# Patient Record
Sex: FEMALE | URBAN - METROPOLITAN AREA
[De-identification: names, ages, dates, MRNs, and addresses within clinical notes are randomized per-mention and may not be internally consistent; named-entity substitution may affect disease eponyms.]

---

## 2017-04-06 ENCOUNTER — INPATIENT (INPATIENT)
Facility: HOSPITAL | Age: 37
LOS: 3 days | Discharge: HOME | End: 2017-04-10
Attending: PSYCHIATRY & NEUROLOGY | Admitting: PSYCHIATRY & NEUROLOGY
Payer: MEDICARE

## 2017-04-06 VITALS
SYSTOLIC BLOOD PRESSURE: 137 MMHG | HEIGHT: 65 IN | DIASTOLIC BLOOD PRESSURE: 91 MMHG | OXYGEN SATURATION: 98 % | HEART RATE: 87 BPM | RESPIRATION RATE: 16 BRPM | WEIGHT: 141.98 LBS | TEMPERATURE: 98 F

## 2017-04-06 DIAGNOSIS — Z91.19 PATIENT'S NONCOMPLIANCE WITH OTHER MEDICAL TREATMENT AND REGIMEN: ICD-10-CM

## 2017-04-06 DIAGNOSIS — F25.0 SCHIZOAFFECTIVE DISORDER, BIPOLAR TYPE: ICD-10-CM

## 2017-04-06 DIAGNOSIS — F10.10 ALCOHOL ABUSE, UNCOMPLICATED: ICD-10-CM

## 2017-04-06 DIAGNOSIS — R69 ILLNESS, UNSPECIFIED: ICD-10-CM

## 2017-04-06 LAB
ALBUMIN SERPL ELPH-MCNC: 3.7 G/DL — SIGNIFICANT CHANGE UP (ref 3.3–5)
ALP SERPL-CCNC: 92 U/L — SIGNIFICANT CHANGE UP (ref 40–120)
ALT FLD-CCNC: 29 U/L — SIGNIFICANT CHANGE UP (ref 12–78)
AMPHET UR-MCNC: NEGATIVE — SIGNIFICANT CHANGE UP
ANION GAP SERPL CALC-SCNC: 9 MMOL/L — SIGNIFICANT CHANGE UP (ref 5–17)
APAP SERPL-MCNC: <2 UG/ML — LOW (ref 10–30)
APPEARANCE UR: CLEAR — SIGNIFICANT CHANGE UP
AST SERPL-CCNC: 21 U/L — SIGNIFICANT CHANGE UP (ref 15–37)
BACTERIA # UR AUTO: ABNORMAL
BARBITURATES UR SCN-MCNC: NEGATIVE — SIGNIFICANT CHANGE UP
BASOPHILS # BLD AUTO: 0.1 K/UL — SIGNIFICANT CHANGE UP (ref 0–0.2)
BASOPHILS NFR BLD AUTO: 0.8 % — SIGNIFICANT CHANGE UP (ref 0–2)
BENZODIAZ UR-MCNC: NEGATIVE — SIGNIFICANT CHANGE UP
BILIRUB SERPL-MCNC: 0.2 MG/DL — SIGNIFICANT CHANGE UP (ref 0.2–1.2)
BILIRUB UR-MCNC: NEGATIVE — SIGNIFICANT CHANGE UP
BUN SERPL-MCNC: 16 MG/DL — SIGNIFICANT CHANGE UP (ref 7–23)
CALCIUM SERPL-MCNC: 8.3 MG/DL — LOW (ref 8.5–10.1)
CHLORIDE SERPL-SCNC: 109 MMOL/L — HIGH (ref 96–108)
CO2 SERPL-SCNC: 27 MMOL/L — SIGNIFICANT CHANGE UP (ref 22–31)
COCAINE METAB.OTHER UR-MCNC: NEGATIVE — SIGNIFICANT CHANGE UP
COLOR SPEC: YELLOW — SIGNIFICANT CHANGE UP
CREAT SERPL-MCNC: 0.7 MG/DL — SIGNIFICANT CHANGE UP (ref 0.5–1.3)
DIFF PNL FLD: ABNORMAL
EOSINOPHIL # BLD AUTO: 0.1 K/UL — SIGNIFICANT CHANGE UP (ref 0–0.5)
EOSINOPHIL NFR BLD AUTO: 1.6 % — SIGNIFICANT CHANGE UP (ref 0–6)
EPI CELLS # UR: SIGNIFICANT CHANGE UP
ETHANOL SERPL-MCNC: 52 MG/DL — HIGH (ref 0–10)
GLUCOSE SERPL-MCNC: 93 MG/DL — SIGNIFICANT CHANGE UP (ref 70–99)
GLUCOSE UR QL: NEGATIVE MG/DL — SIGNIFICANT CHANGE UP
HCG SERPL-ACNC: <1 MIU/ML — SIGNIFICANT CHANGE UP
HCT VFR BLD CALC: 37.9 % — SIGNIFICANT CHANGE UP (ref 34.5–45)
HGB BLD-MCNC: 13 G/DL — SIGNIFICANT CHANGE UP (ref 11.5–15.5)
KETONES UR-MCNC: NEGATIVE — SIGNIFICANT CHANGE UP
LEUKOCYTE ESTERASE UR-ACNC: ABNORMAL
LYMPHOCYTES # BLD AUTO: 2 K/UL — SIGNIFICANT CHANGE UP (ref 1–3.3)
LYMPHOCYTES # BLD AUTO: 29.2 % — SIGNIFICANT CHANGE UP (ref 13–44)
MCHC RBC-ENTMCNC: 29.1 PG — SIGNIFICANT CHANGE UP (ref 27–34)
MCHC RBC-ENTMCNC: 34.4 GM/DL — SIGNIFICANT CHANGE UP (ref 32–36)
MCV RBC AUTO: 84.6 FL — SIGNIFICANT CHANGE UP (ref 80–100)
METHADONE UR-MCNC: NEGATIVE — SIGNIFICANT CHANGE UP
MONOCYTES # BLD AUTO: 0.6 K/UL — SIGNIFICANT CHANGE UP (ref 0–0.9)
MONOCYTES NFR BLD AUTO: 8.6 % — SIGNIFICANT CHANGE UP (ref 2–14)
NEUTROPHILS # BLD AUTO: 4.2 K/UL — SIGNIFICANT CHANGE UP (ref 1.8–7.4)
NEUTROPHILS NFR BLD AUTO: 59.8 % — SIGNIFICANT CHANGE UP (ref 43–77)
NITRITE UR-MCNC: NEGATIVE — SIGNIFICANT CHANGE UP
OPIATES UR-MCNC: NEGATIVE — SIGNIFICANT CHANGE UP
PCP SPEC-MCNC: SIGNIFICANT CHANGE UP
PCP UR-MCNC: NEGATIVE — SIGNIFICANT CHANGE UP
PH UR: 5 — SIGNIFICANT CHANGE UP (ref 4.8–8)
PLATELET # BLD AUTO: 231 K/UL — SIGNIFICANT CHANGE UP (ref 150–400)
POTASSIUM SERPL-MCNC: 4.1 MMOL/L — SIGNIFICANT CHANGE UP (ref 3.5–5.3)
POTASSIUM SERPL-SCNC: 4.1 MMOL/L — SIGNIFICANT CHANGE UP (ref 3.5–5.3)
PROT SERPL-MCNC: 7.2 GM/DL — SIGNIFICANT CHANGE UP (ref 6–8.3)
PROT UR-MCNC: 15 MG/DL
RBC # BLD: 4.48 M/UL — SIGNIFICANT CHANGE UP (ref 3.8–5.2)
RBC # FLD: 12.9 % — SIGNIFICANT CHANGE UP (ref 11–15)
RBC CASTS # UR COMP ASSIST: ABNORMAL /HPF (ref 0–4)
SALICYLATES SERPL-MCNC: 2.6 MG/DL — LOW (ref 2.8–20)
SODIUM SERPL-SCNC: 145 MMOL/L — SIGNIFICANT CHANGE UP (ref 135–145)
SP GR SPEC: 1.02 — SIGNIFICANT CHANGE UP (ref 1.01–1.02)
T3 SERPL-MCNC: 123 NG/DL — SIGNIFICANT CHANGE UP (ref 80–200)
T4 AB SER-ACNC: 7.5 UG/DL — SIGNIFICANT CHANGE UP (ref 4.6–12)
THC UR QL: NEGATIVE — SIGNIFICANT CHANGE UP
TSH SERPL-MCNC: 0.82 UIU/ML — SIGNIFICANT CHANGE UP (ref 0.36–3.74)
UROBILINOGEN FLD QL: NEGATIVE MG/DL — SIGNIFICANT CHANGE UP
WBC # BLD: 7 K/UL — SIGNIFICANT CHANGE UP (ref 3.8–10.5)
WBC # FLD AUTO: 7 K/UL — SIGNIFICANT CHANGE UP (ref 3.8–10.5)
WBC UR QL: SIGNIFICANT CHANGE UP

## 2017-04-06 PROCEDURE — 90792 PSYCH DIAG EVAL W/MED SRVCS: CPT

## 2017-04-06 PROCEDURE — 70450 CT HEAD/BRAIN W/O DYE: CPT | Mod: 26

## 2017-04-06 PROCEDURE — 99284 EMERGENCY DEPT VISIT MOD MDM: CPT

## 2017-04-06 RX ORDER — DIPHENHYDRAMINE HCL 50 MG
50 CAPSULE ORAL ONCE
Qty: 0 | Refills: 0 | Status: DISCONTINUED | OUTPATIENT
Start: 2017-04-06 | End: 2017-04-10

## 2017-04-06 RX ORDER — RISPERIDONE 4 MG/1
0.5 TABLET ORAL
Qty: 0 | Refills: 0 | Status: DISCONTINUED | OUTPATIENT
Start: 2017-04-06 | End: 2017-04-10

## 2017-04-06 RX ORDER — HALOPERIDOL DECANOATE 100 MG/ML
5 INJECTION INTRAMUSCULAR ONCE
Qty: 0 | Refills: 0 | Status: DISCONTINUED | OUTPATIENT
Start: 2017-04-06 | End: 2017-04-10

## 2017-04-06 RX ORDER — HALOPERIDOL DECANOATE 100 MG/ML
5 INJECTION INTRAMUSCULAR EVERY 6 HOURS
Qty: 0 | Refills: 0 | Status: DISCONTINUED | OUTPATIENT
Start: 2017-04-06 | End: 2017-04-10

## 2017-04-06 RX ORDER — DIPHENHYDRAMINE HCL 50 MG
50 CAPSULE ORAL EVERY 6 HOURS
Qty: 0 | Refills: 0 | Status: DISCONTINUED | OUTPATIENT
Start: 2017-04-06 | End: 2017-04-10

## 2017-04-06 RX ORDER — ACETAMINOPHEN 500 MG
650 TABLET ORAL EVERY 6 HOURS
Qty: 0 | Refills: 0 | Status: DISCONTINUED | OUTPATIENT
Start: 2017-04-06 | End: 2017-04-10

## 2017-04-06 RX ADMIN — RISPERIDONE 0.5 MILLIGRAM(S): 4 TABLET ORAL at 21:14

## 2017-04-06 NOTE — ED BEHAVIORAL HEALTH ASSESSMENT NOTE - AXIS IV
Problems with primary support/Economic problems/Problems with access to healthcare services/Problem related to social environment/Housing problems/Other psychosocial and environmental problems

## 2017-04-06 NOTE — ED ADULT TRIAGE NOTE - CHIEF COMPLAINT QUOTE
brought by ems for psych evaluation. pt is from the [facility] ( [facility])  was acting bizzare

## 2017-04-06 NOTE — ED BEHAVIORAL HEALTH ASSESSMENT NOTE - DESCRIPTION
Calm, but guarded and evasive Seizure disorder? The patient is a 36 year old woman, single, registered with [facility], has limited family supports

## 2017-04-06 NOTE — ED ADULT NURSE NOTE - OBJECTIVE STATEMENT
patient received, alert and oriented x3, from [facility], patient refuses to talk about it. states she has a "seizure" denies hurting self or others. patient appears calm, noticed patient talking to herself. denies hearing voices

## 2017-04-06 NOTE — ED ADULT NURSE NOTE - CHIEF COMPLAINT QUOTE
brought by ems for psych evaluation. pt is from the [facility] ( [facility])  was acting bizzare

## 2017-04-06 NOTE — ED BEHAVIORAL HEALTH ASSESSMENT NOTE - PSYCHIATRIC ISSUES AND PLAN (INCLUDE STANDING AND PRN MEDICATION)
Start a trial of Risperidone 0.5mg BID PO, titrate to 1mg BID PO tomorrow. Consider a mood stabilizer.

## 2017-04-06 NOTE — ED BEHAVIORAL HEALTH ASSESSMENT NOTE - OTHER PAST PSYCHIATRIC HISTORY (INCLUDE DETAILS REGARDING ONSET, COURSE OF ILLNESS, INPATIENT/OUTPATIENT TREATMENT)
Patient has a suspected psychiatric history of Schizophrenia/Schizoaffective disorder-bipolar type, likely multiple prior inpatient psychiatric admissions, unclear about prior SI or HI or legal issues, has a documented history of Alcohol abuse.

## 2017-04-06 NOTE — ED BEHAVIORAL HEALTH ASSESSMENT NOTE - HPI (INCLUDE ILLNESS QUALITY, SEVERITY, DURATION, TIMING, CONTEXT, MODIFYING FACTORS, ASSOCIATED SIGNS AND SYMPTOMS)
Briefly, the patient is a 36 year old woman, single, registered with [facility], has limited family supports, states that she has a medical history significant for Seizure disorder, has a suspected psychiatric history of Schizophrenia/Schizoaffective disorder-bipolar type, likely multiple prior inpatient psychiatric admissions, unclear about prior SI or HI or legal issues, has a documented history of Alcohol abuse, was sent from shelter with complaints of worsening agitation, erratic behaviors, threats of aggression and responding to internal stimuli. Here in the ED, patient refuses to sign documentations. Calm, but intermittently can be noted to be responding to internal stimuli. BAL: 52 this morning  Met with the patient. Bizarre, guarded, evasive, and can be rather hostile and irritable. During the interview noted that patient was rather labile, with thought process that was notably disorganized, with FOI and KIKA. Paranoid++- ' My mother is sending spies on me. They in Hotel. I am just going to get her deported', etc. Multiple other paranoid delusions noted during the interview. Noted to be also responding to internal stimuli during the interview- ' Didn't you hear that. She just called me Bipolar'. Unable to engage in a rationale conversation with the patient considering her lability, hostility and thought disorganization. Admits to drinking alcohol yesterday, and again very perseverative about ' Put on alcohol abuse in my diagnosis. Don't defame me with a diagnosis of Schizophrenia or Bipolar disorder. I will take you to court for defamation'. Discussed history of seizure, and patient states- ' I am having a seizure right now. Cant you hear my seizure?'.   Obtained collateral from the patient's CM at [facility]. States that patient has been in the shelter system since 2015, and has been at this particular hotel since 3/2017. States that usually patient is bizarre and erratic, and generally avoids an evaluation. States that yesterday patient appeared intoxicated, was aggressive and agitated. This morning despite having sobered up, patient was erratic, bizarre, picked up fights with residents, threatened to 'kill' the '. As per staff, patient has been responding to internal stimuli in the hotel.

## 2017-04-06 NOTE — ED BEHAVIORAL HEALTH ASSESSMENT NOTE - OTHER
JOSE Scott ([phone]- ext: [phone]) with other residents normal here not assessed none obvious no family supports, noncompliance

## 2017-04-06 NOTE — ED PROVIDER NOTE - OBJECTIVE STATEMENT
37yo female with PMHx of schizophrenia and questionable compliance with meds was sent to the ED from her shelter [facility] after she was found by the staff to be visibly intoxicated, agitated, arguing with other clients, and urinating on the floor.  The patient states she only has a history of seizure disorder with unknown etiology on Tegretol; with uncertain seizure pattern.  Pt denies seizure today.  When attempting to obtain HPI and ROS pt becomes very agitated and does not answer further questioning.  History obtained from Sonia Yates (at [facility]) [phone] ext [phone].   Amalia Lai ext [phone]

## 2017-04-06 NOTE — ED PROVIDER NOTE - MEDICAL DECISION MAKING DETAILS
35yo female sent from [facility] for bizarre behavior.  Pt gives no further hx of ROS but is medically cleared and will be psychiatrically evaluated

## 2017-04-06 NOTE — ED BEHAVIORAL HEALTH ASSESSMENT NOTE - SUMMARY
Briefly, the patient is a 36 year old woman, single, registered with [employer], has limited family supports, states that she has a medical history significant for Seizure disorder, has a suspected psychiatric history of Schizophrenia/Schizoaffective disorder-bipolar type, likely multiple prior inpatient psychiatric admissions, unclear about prior SI or HI or legal issues, has a documented history of Alcohol abuse, was sent from shelter with complaints of worsening agitation, erratic behaviors, threats of aggression and responding to internal stimuli. Here in the ED, patient refuses to sign documentations. Calm, but intermittently can be noted to be responding to internal stimuli. BAL: 52 this morning  Bizarre, guarded, evasive, and can be rather hostile and irritable. During the interview noted that patient was rather labile, with thought process that was notably disorganized, with FOI and KIKA. Paranoid delusions++-. Collateral points towards worsening psychiatric decompensation along with agitation, aggression, and AH. Alcohol intoxication likely contributed to be behavioral issues yesterday, but now patient is sober, and her presentation appears acute and more contributed by decompensated Schizophrenia/Schizoaffective disorder. Patient at this point requires an inpatient psychiatric admission for symptom stabilization, medication management and ensuring safety. 9.39 signed

## 2017-04-07 PROCEDURE — 99222 1ST HOSP IP/OBS MODERATE 55: CPT

## 2017-04-07 PROCEDURE — 99233 SBSQ HOSP IP/OBS HIGH 50: CPT

## 2017-04-07 RX ORDER — CARBAMAZEPINE 200 MG
100 TABLET ORAL DAILY
Qty: 0 | Refills: 0 | Status: DISCONTINUED | OUTPATIENT
Start: 2017-04-07 | End: 2017-04-10

## 2017-04-07 RX ORDER — FOLIC ACID 0.8 MG
1 TABLET ORAL DAILY
Qty: 0 | Refills: 0 | Status: DISCONTINUED | OUTPATIENT
Start: 2017-04-07 | End: 2017-04-10

## 2017-04-07 RX ORDER — PETROLATUM,WHITE
1 JELLY (GRAM) TOPICAL
Qty: 0 | Refills: 0 | Status: DISCONTINUED | OUTPATIENT
Start: 2017-04-07 | End: 2017-04-10

## 2017-04-07 RX ADMIN — Medication 100 MILLIGRAM(S): at 14:34

## 2017-04-07 RX ADMIN — Medication 2 MILLIGRAM(S): at 06:36

## 2017-04-07 RX ADMIN — Medication 2 MILLIGRAM(S): at 16:01

## 2017-04-07 RX ADMIN — Medication 2 MILLIGRAM(S): at 20:39

## 2017-04-07 RX ADMIN — RISPERIDONE 0.5 MILLIGRAM(S): 4 TABLET ORAL at 09:17

## 2017-04-07 RX ADMIN — RISPERIDONE 0.5 MILLIGRAM(S): 4 TABLET ORAL at 20:39

## 2017-04-07 RX ADMIN — Medication 1 MILLIGRAM(S): at 09:18

## 2017-04-08 LAB
CHOLEST SERPL-MCNC: 196 MG/DL — SIGNIFICANT CHANGE UP (ref 10–199)
HBA1C BLD-MCNC: 5.9 % — HIGH (ref 4–5.6)
HDLC SERPL-MCNC: 52 MG/DL — SIGNIFICANT CHANGE UP (ref 40–125)
LIPID PNL WITH DIRECT LDL SERPL: 131 MG/DL — HIGH
TOTAL CHOLESTEROL/HDL RATIO MEASUREMENT: 3.8 RATIO — SIGNIFICANT CHANGE UP (ref 3.3–7.1)
TRIGL SERPL-MCNC: 64 MG/DL — SIGNIFICANT CHANGE UP (ref 10–149)

## 2017-04-08 PROCEDURE — 99231 SBSQ HOSP IP/OBS SF/LOW 25: CPT

## 2017-04-08 RX ADMIN — Medication 1 MILLIGRAM(S): at 09:50

## 2017-04-08 RX ADMIN — RISPERIDONE 0.5 MILLIGRAM(S): 4 TABLET ORAL at 20:37

## 2017-04-08 RX ADMIN — Medication 1.5 MILLIGRAM(S): at 09:51

## 2017-04-08 RX ADMIN — Medication 1 TABLET(S): at 09:50

## 2017-04-08 RX ADMIN — Medication 1.5 MILLIGRAM(S): at 18:05

## 2017-04-08 RX ADMIN — Medication 100 MILLIGRAM(S): at 09:50

## 2017-04-08 RX ADMIN — Medication 1.5 MILLIGRAM(S): at 20:38

## 2017-04-08 RX ADMIN — Medication 1.5 MILLIGRAM(S): at 14:46

## 2017-04-08 RX ADMIN — RISPERIDONE 0.5 MILLIGRAM(S): 4 TABLET ORAL at 09:50

## 2017-04-09 PROCEDURE — 99232 SBSQ HOSP IP/OBS MODERATE 35: CPT

## 2017-04-09 RX ADMIN — Medication 100 MILLIGRAM(S): at 09:21

## 2017-04-09 RX ADMIN — Medication 1.5 MILLIGRAM(S): at 05:59

## 2017-04-09 RX ADMIN — RISPERIDONE 0.5 MILLIGRAM(S): 4 TABLET ORAL at 20:18

## 2017-04-09 RX ADMIN — Medication 1 MILLIGRAM(S): at 09:21

## 2017-04-09 RX ADMIN — Medication 1 MILLIGRAM(S): at 18:49

## 2017-04-09 RX ADMIN — RISPERIDONE 0.5 MILLIGRAM(S): 4 TABLET ORAL at 09:21

## 2017-04-09 RX ADMIN — Medication 1 TABLET(S): at 09:21

## 2017-04-10 VITALS
SYSTOLIC BLOOD PRESSURE: 107 MMHG | HEART RATE: 101 BPM | OXYGEN SATURATION: 100 % | RESPIRATION RATE: 18 BRPM | TEMPERATURE: 99 F | DIASTOLIC BLOOD PRESSURE: 68 MMHG

## 2017-04-10 PROCEDURE — 99239 HOSP IP/OBS DSCHRG MGMT >30: CPT

## 2017-04-10 RX ORDER — RISPERIDONE 4 MG/1
1 TABLET ORAL
Qty: 60 | Refills: 0 | OUTPATIENT
Start: 2017-04-10 | End: 2017-05-10

## 2017-04-10 RX ORDER — CARBAMAZEPINE 200 MG
1 TABLET ORAL
Qty: 30 | Refills: 0 | OUTPATIENT
Start: 2017-04-10 | End: 2017-05-10

## 2017-04-10 RX ORDER — FOLIC ACID 0.8 MG
1 TABLET ORAL
Qty: 30 | Refills: 0 | OUTPATIENT
Start: 2017-04-10 | End: 2017-05-10

## 2017-04-10 RX ADMIN — RISPERIDONE 0.5 MILLIGRAM(S): 4 TABLET ORAL at 09:14

## 2017-04-10 RX ADMIN — Medication 1 MILLIGRAM(S): at 09:14

## 2017-04-10 RX ADMIN — Medication 1 TABLET(S): at 09:14

## 2017-04-10 RX ADMIN — Medication 1 MILLIGRAM(S): at 06:49

## 2017-04-10 RX ADMIN — Medication 100 MILLIGRAM(S): at 09:14

## 2017-04-10 RX ADMIN — Medication 1 MILLIGRAM(S): at 13:46

## 2017-04-12 DIAGNOSIS — F25.0 SCHIZOAFFECTIVE DISORDER, BIPOLAR TYPE: ICD-10-CM

## 2017-04-12 DIAGNOSIS — R44.0 AUDITORY HALLUCINATIONS: ICD-10-CM

## 2017-04-12 DIAGNOSIS — F10.20 ALCOHOL DEPENDENCE, UNCOMPLICATED: ICD-10-CM

## 2017-04-12 DIAGNOSIS — Z91.14 PATIENT'S OTHER NONCOMPLIANCE WITH MEDICATION REGIMEN: ICD-10-CM

## 2017-04-12 DIAGNOSIS — F20.9 SCHIZOPHRENIA, UNSPECIFIED: ICD-10-CM

## 2017-04-12 DIAGNOSIS — G40.909 EPILEPSY, UNSPECIFIED, NOT INTRACTABLE, WITHOUT STATUS EPILEPTICUS: ICD-10-CM

## 2017-04-13 ENCOUNTER — INPATIENT (INPATIENT)
Facility: HOSPITAL | Age: 37
LOS: 0 days | Discharge: HOME | End: 2017-04-14
Attending: PSYCHIATRY & NEUROLOGY | Admitting: PSYCHIATRY & NEUROLOGY
Payer: MEDICARE

## 2017-04-13 VITALS
DIASTOLIC BLOOD PRESSURE: 94 MMHG | HEART RATE: 100 BPM | TEMPERATURE: 97 F | OXYGEN SATURATION: 98 % | RESPIRATION RATE: 17 BRPM | SYSTOLIC BLOOD PRESSURE: 137 MMHG | WEIGHT: 164.91 LBS | HEIGHT: 65 IN

## 2017-04-13 DIAGNOSIS — F12.10 CANNABIS ABUSE, UNCOMPLICATED: ICD-10-CM

## 2017-04-13 DIAGNOSIS — Z91.19 PATIENT'S NONCOMPLIANCE WITH OTHER MEDICAL TREATMENT AND REGIMEN: ICD-10-CM

## 2017-04-13 DIAGNOSIS — F10.10 ALCOHOL ABUSE, UNCOMPLICATED: ICD-10-CM

## 2017-04-13 DIAGNOSIS — F25.0 SCHIZOAFFECTIVE DISORDER, BIPOLAR TYPE: ICD-10-CM

## 2017-04-13 DIAGNOSIS — F60.9 PERSONALITY DISORDER, UNSPECIFIED: ICD-10-CM

## 2017-04-13 LAB
ALBUMIN SERPL ELPH-MCNC: 3.7 G/DL — SIGNIFICANT CHANGE UP (ref 3.3–5)
ALP SERPL-CCNC: 105 U/L — SIGNIFICANT CHANGE UP (ref 40–120)
ALT FLD-CCNC: 26 U/L — SIGNIFICANT CHANGE UP (ref 12–78)
AMPHET UR-MCNC: NEGATIVE — SIGNIFICANT CHANGE UP
ANION GAP SERPL CALC-SCNC: 11 MMOL/L — SIGNIFICANT CHANGE UP (ref 5–17)
APAP SERPL-MCNC: <2 UG/ML — LOW (ref 10–30)
APPEARANCE UR: CLEAR — SIGNIFICANT CHANGE UP
AST SERPL-CCNC: 22 U/L — SIGNIFICANT CHANGE UP (ref 15–37)
BACTERIA # UR AUTO: ABNORMAL
BARBITURATES UR SCN-MCNC: NEGATIVE — SIGNIFICANT CHANGE UP
BASOPHILS # BLD AUTO: 0.1 K/UL — SIGNIFICANT CHANGE UP (ref 0–0.2)
BASOPHILS NFR BLD AUTO: 0.9 % — SIGNIFICANT CHANGE UP (ref 0–2)
BENZODIAZ UR-MCNC: NEGATIVE — SIGNIFICANT CHANGE UP
BILIRUB SERPL-MCNC: 0.4 MG/DL — SIGNIFICANT CHANGE UP (ref 0.2–1.2)
BILIRUB UR-MCNC: NEGATIVE — SIGNIFICANT CHANGE UP
BUN SERPL-MCNC: 12 MG/DL — SIGNIFICANT CHANGE UP (ref 7–23)
CALCIUM SERPL-MCNC: 8.3 MG/DL — LOW (ref 8.5–10.1)
CHLORIDE SERPL-SCNC: 105 MMOL/L — SIGNIFICANT CHANGE UP (ref 96–108)
CO2 SERPL-SCNC: 25 MMOL/L — SIGNIFICANT CHANGE UP (ref 22–31)
COCAINE METAB.OTHER UR-MCNC: NEGATIVE — SIGNIFICANT CHANGE UP
COLOR SPEC: YELLOW — SIGNIFICANT CHANGE UP
CREAT SERPL-MCNC: 0.65 MG/DL — SIGNIFICANT CHANGE UP (ref 0.5–1.3)
DIFF PNL FLD: ABNORMAL
EOSINOPHIL # BLD AUTO: 0 K/UL — SIGNIFICANT CHANGE UP (ref 0–0.5)
EOSINOPHIL NFR BLD AUTO: 0.4 % — SIGNIFICANT CHANGE UP (ref 0–6)
EPI CELLS # UR: ABNORMAL
ETHANOL SERPL-MCNC: 16 MG/DL — HIGH (ref 0–10)
GLUCOSE SERPL-MCNC: 85 MG/DL — SIGNIFICANT CHANGE UP (ref 70–99)
GLUCOSE UR QL: NEGATIVE MG/DL — SIGNIFICANT CHANGE UP
HCG SERPL-ACNC: <1 MIU/ML — SIGNIFICANT CHANGE UP
HCT VFR BLD CALC: 36.1 % — SIGNIFICANT CHANGE UP (ref 34.5–45)
HGB BLD-MCNC: 12.5 G/DL — SIGNIFICANT CHANGE UP (ref 11.5–15.5)
KETONES UR-MCNC: NEGATIVE — SIGNIFICANT CHANGE UP
LEUKOCYTE ESTERASE UR-ACNC: NEGATIVE — SIGNIFICANT CHANGE UP
LYMPHOCYTES # BLD AUTO: 1.8 K/UL — SIGNIFICANT CHANGE UP (ref 1–3.3)
LYMPHOCYTES # BLD AUTO: 20.8 % — SIGNIFICANT CHANGE UP (ref 13–44)
MCHC RBC-ENTMCNC: 28.7 PG — SIGNIFICANT CHANGE UP (ref 27–34)
MCHC RBC-ENTMCNC: 34.6 GM/DL — SIGNIFICANT CHANGE UP (ref 32–36)
MCV RBC AUTO: 83.1 FL — SIGNIFICANT CHANGE UP (ref 80–100)
METHADONE UR-MCNC: NEGATIVE — SIGNIFICANT CHANGE UP
MONOCYTES # BLD AUTO: 0.6 K/UL — SIGNIFICANT CHANGE UP (ref 0–0.9)
MONOCYTES NFR BLD AUTO: 7 % — SIGNIFICANT CHANGE UP (ref 2–14)
NEUTROPHILS # BLD AUTO: 6.2 K/UL — SIGNIFICANT CHANGE UP (ref 1.8–7.4)
NEUTROPHILS NFR BLD AUTO: 70.9 % — SIGNIFICANT CHANGE UP (ref 43–77)
NITRITE UR-MCNC: NEGATIVE — SIGNIFICANT CHANGE UP
OPIATES UR-MCNC: NEGATIVE — SIGNIFICANT CHANGE UP
PCP SPEC-MCNC: SIGNIFICANT CHANGE UP
PCP UR-MCNC: NEGATIVE — SIGNIFICANT CHANGE UP
PH UR: 5 — SIGNIFICANT CHANGE UP (ref 4.8–8)
PLATELET # BLD AUTO: 210 K/UL — SIGNIFICANT CHANGE UP (ref 150–400)
POTASSIUM SERPL-MCNC: 4.2 MMOL/L — SIGNIFICANT CHANGE UP (ref 3.5–5.3)
POTASSIUM SERPL-SCNC: 4.2 MMOL/L — SIGNIFICANT CHANGE UP (ref 3.5–5.3)
PROT SERPL-MCNC: 7.5 GM/DL — SIGNIFICANT CHANGE UP (ref 6–8.3)
PROT UR-MCNC: 30 MG/DL
RBC # BLD: 4.35 M/UL — SIGNIFICANT CHANGE UP (ref 3.8–5.2)
RBC # FLD: 12.7 % — SIGNIFICANT CHANGE UP (ref 11–15)
SALICYLATES SERPL-MCNC: 2.1 MG/DL — LOW (ref 2.8–20)
SODIUM SERPL-SCNC: 141 MMOL/L — SIGNIFICANT CHANGE UP (ref 135–145)
SP GR SPEC: 1.02 — SIGNIFICANT CHANGE UP (ref 1.01–1.02)
THC UR QL: POSITIVE — SIGNIFICANT CHANGE UP
UROBILINOGEN FLD QL: NEGATIVE MG/DL — SIGNIFICANT CHANGE UP
WBC # BLD: 8.8 K/UL — SIGNIFICANT CHANGE UP (ref 3.8–10.5)
WBC # FLD AUTO: 8.8 K/UL — SIGNIFICANT CHANGE UP (ref 3.8–10.5)

## 2017-04-13 PROCEDURE — 90792 PSYCH DIAG EVAL W/MED SRVCS: CPT

## 2017-04-13 PROCEDURE — 99285 EMERGENCY DEPT VISIT HI MDM: CPT | Mod: 25

## 2017-04-13 RX ORDER — CARBAMAZEPINE 200 MG
100 TABLET ORAL DAILY
Qty: 0 | Refills: 0 | Status: DISCONTINUED | OUTPATIENT
Start: 2017-04-14 | End: 2017-04-14

## 2017-04-13 RX ORDER — ACETAMINOPHEN 500 MG
650 TABLET ORAL EVERY 6 HOURS
Qty: 0 | Refills: 0 | Status: DISCONTINUED | OUTPATIENT
Start: 2017-04-13 | End: 2017-04-14

## 2017-04-13 RX ORDER — DIPHENHYDRAMINE HCL 50 MG
50 CAPSULE ORAL ONCE
Qty: 0 | Refills: 0 | Status: DISCONTINUED | OUTPATIENT
Start: 2017-04-13 | End: 2017-04-14

## 2017-04-13 RX ORDER — RISPERIDONE 4 MG/1
1 TABLET ORAL
Qty: 0 | Refills: 0 | Status: DISCONTINUED | OUTPATIENT
Start: 2017-04-13 | End: 2017-04-14

## 2017-04-13 RX ORDER — DIPHENHYDRAMINE HCL 50 MG
50 CAPSULE ORAL EVERY 6 HOURS
Qty: 0 | Refills: 0 | Status: DISCONTINUED | OUTPATIENT
Start: 2017-04-13 | End: 2017-04-14

## 2017-04-13 RX ORDER — HALOPERIDOL DECANOATE 100 MG/ML
5 INJECTION INTRAMUSCULAR EVERY 6 HOURS
Qty: 0 | Refills: 0 | Status: DISCONTINUED | OUTPATIENT
Start: 2017-04-13 | End: 2017-04-14

## 2017-04-13 RX ORDER — HALOPERIDOL DECANOATE 100 MG/ML
5 INJECTION INTRAMUSCULAR ONCE
Qty: 0 | Refills: 0 | Status: DISCONTINUED | OUTPATIENT
Start: 2017-04-13 | End: 2017-04-14

## 2017-04-13 RX ORDER — RISPERIDONE 4 MG/1
1 TABLET ORAL ONCE
Qty: 0 | Refills: 0 | Status: COMPLETED | OUTPATIENT
Start: 2017-04-13 | End: 2017-04-13

## 2017-04-13 RX ADMIN — RISPERIDONE 1 MILLIGRAM(S): 4 TABLET ORAL at 11:38

## 2017-04-13 RX ADMIN — RISPERIDONE 1 MILLIGRAM(S): 4 TABLET ORAL at 17:38

## 2017-04-13 RX ADMIN — Medication 1 MILLIGRAM(S): at 17:38

## 2017-04-13 RX ADMIN — Medication 1 MILLIGRAM(S): at 14:59

## 2017-04-13 RX ADMIN — Medication 1 MILLIGRAM(S): at 21:13

## 2017-04-13 NOTE — ED BEHAVIORAL HEALTH ASSESSMENT NOTE - AXIS IV
Problems with primary support/Problems with access to healthcare services/Problem related to social environment/Housing problems/Other psychosocial and environmental problems

## 2017-04-13 NOTE — ED BEHAVIORAL HEALTH ASSESSMENT NOTE - DESCRIPTION
calm in her room, but easily agitated when engaged in a conversation Seizure disorder the patient is a 36 year old woman, single, unemployed-disabled, likely homeless, established with the shelter system

## 2017-04-13 NOTE — ED BEHAVIORAL HEALTH ASSESSMENT NOTE - HPI (INCLUDE ILLNESS QUALITY, SEVERITY, DURATION, TIMING, CONTEXT, MODIFYING FACTORS, ASSOCIATED SIGNS AND SYMPTOMS)
Briefly, the patient is a 36 year old woman, single, unemployed-disabled, likely homeless, established with the shelter system, has a medical history notable for possible Seizure disorder, has  a psychiatric history significant for Schizoaffective disorder vs Bipolar disorder with psychosis, Alcohol abuse, Marijuana abuse, has a history of prior inpatient psychiatric admissions, was admitted to  on 4/6/2017 for disorganization, psychosis, agitation, was discharged earlier this week with plans to follow up at [facility], presented self to the ED today claiming she needed to be hospitalized. Noted to be very disorganized, erratic, bizarre, paranoid, and responding to internal stimuli. Calm in her room. Remains on 1:1CO.   Met with the patient. Irritable, snappy, cursing at MD. Thought process is very disorganized, with some FOA, and derailment. Slightly labile. Asks for an 'admission to the [facility] unit'. Noted to be paranoid- ' It the people out there who do this to me. Those [id number]#$#$% spies'. Slightly grandiose- ' I have made millions by bringing hospitals down'. Easily agitated in the room with the MD. Even though she denies any A/VH, patient can be noted to be responding to internal stimuli in the room, and also in the last few hours by staff. States that she has been noncompliant with Risperidone and did not follow up at the [facility] appointment.  Very difficult to engage the patient in any logical conversation because of her irritability, and thought disorganization.   Contacted patient's mother Dasia ([phone]) who states that since her discharge from , patient has been drinking alcohol, 'smoking something'. States that she has been noncompliant with medications, has been increasingly irritable, agitated and threatening at home. States that she has not slept in the last 3 days. States that she has been talking- laughing to self at night, and making ' weird sounds in her room'.

## 2017-04-13 NOTE — ED BEHAVIORAL HEALTH ASSESSMENT NOTE - SUMMARY
Briefly, the patient is a 36 year old woman, single, unemployed-disabled, likely homeless, established with the shelter system, has a medical history notable for possible Seizure disorder, has  a psychiatric history significant for Schizoaffective disorder vs Bipolar disorder with psychosis, Alcohol abuse, Marijuana abuse, has a history of prior inpatient psychiatric admissions, was admitted to 2B on 4/6/2017 for disorganization, psychosis, agitation, was discharged earlier this week with plans to follow up at [facility], presented self to the ED today claiming she needed to be hospitalized.   Noted to be very disorganized, with some FOA, and derailment erratic, bizarre, paranoid, and responding to internal stimuli. Irritable, snappy, cursing at MD. Slightly labile. Slightly grandiose- ' I have made millions by bringing hospitals down'. Easily agitated in the room with the MD. Even though she denies any A/VH, patient can be noted to be responding to internal stimuli in the room, and also in the last few hours by staff. Collateral information from patient's mother points towards worsening symptoms of psychosis, behavioral issues, agitation, and no sleep in the last 3 days. Suspecting underlying Schizoaffective disorder vs Bipolar disorder with psychosis which has worsened with noncompliance and ongoing substance abuse

## 2017-04-13 NOTE — ED PROVIDER NOTE - OBJECTIVE STATEMENT
37 yo female with pmh seizure disorder, suspected psych history of Schizophrenia/Schizoaffective disorder-bipolar type, alcohol abuse, recently admitted 7 days ago presents for evaluation. Pt reports she wants to be admitted for the "same thing" she was admitted for recently. Pt reports she was dc home to her mom, but states she can't live there. Pt is AOx3, but very tangential in thought process and won't complete a thought, going along tangents of urinating in hotels and about someone noting she     likely multiple prior inpatient psychiatric admissions, unclear about prior SI or HI or legal issues, has a documented history of Alcohol abuse, was sent from shelter with complaints of worsening agitation, erratic behaviors, threats of aggression and responding to internal stimuli. Here in the ED, patient refuses to sign documentations. Calm, but intermittently can be noted to be responding to internal stimuli. BAL: 52 this morning  Bizarre, guarded, evasive, and can be rather hostile and irritable. During the interview noted that patient was rather labile, with thought process that was notably disorganized, with FOI and KIKA. Paranoid delusions++-. Collateral points towards worsening psychiatric decompensation along with agitation, aggression, and AH. Alcohol intoxication likely contributed to be behavioral issues yesterday, but now patient is sober, and her presentation appears acute and more contributed by decompensated Schizophrenia/Schizoaffective disorder. Patient at this point requires an inpatient psychiatric admission for symptom stabilization, medication management and ensuring safety. 9.39 signed 35 yo female with pmh seizure disorder, suspected psych history of Schizophrenia/Schizoaffective disorder-bipolar type, alcohol abuse, recently admitted 7 days ago presents for evaluation. Pt reports she wants to be admitted for the "same thing" she was admitted for recently. Pt reports she was dc home to her mom, but states she can't live there. Pt is AOx3, but very tangential and disorganized in thought process and won't complete a thought, going along tangents of urinating in hotels and about someone spying her about her urinating. Pt also irritable about simple questions. Pt denies complaints otherwise.  Pt reports + alcohol and MJ yesterday. Pt denies SI/HI. 35 yo female with pmh seizure disorder, suspected psych history of Schizophrenia/Schizoaffective disorder-bipolar type, alcohol abuse, recently admitted 7 days ago presents for evaluation. Pt reports she wants to be admitted for the "same thing" she was admitted for recently. Pt reports she was dc home to her mom, but states she can't live there. Pt is AOx3, but very tangential and disorganized in thought process and won't complete a thought, going along tangents of urinating in hotels and about someone spying her about her urinating. Pt also irritable about simple questions. Pt denies complaints otherwise.  Pt reports + alcohol and MJ yesterday. Pt denies SI/HI.    ROS: No fever/chills. No photophobia/eye pain/changes in vision, No ear pain/sore throat/dysphagia, No chest pain/palpitations. No SOB/cough/stridor. No abdominal pain, N/V/D, no black/bloody bm. No dysuria/frequency/discharge, No headache. No Dizziness.  No rash.  No numbness/tingling/weakness.

## 2017-04-13 NOTE — ED PROVIDER NOTE - PHYSICAL EXAMINATION

## 2017-04-13 NOTE — ED ADULT TRIAGE NOTE - CHIEF COMPLAINT QUOTE
pt states, " I want to be admitted, I was drinking, and Im hearing voices" pt denies suicidal ideation and homicidal ideation . pt observed in triage talking to self.

## 2017-04-13 NOTE — ED BEHAVIORAL HEALTH ASSESSMENT NOTE - PSYCHIATRIC ISSUES AND PLAN (INCLUDE STANDING AND PRN MEDICATION)
Risperidone 1mg once now. Start Risperidone 1mg BID PO. PRN medications for agitation will be ordered as well

## 2017-04-13 NOTE — ED ADULT NURSE NOTE - OBJECTIVE STATEMENT
Pt verbalized smoking marijuana and drinking recently, felt like she needs to come to the hospital. Pt verbalized currently staying in a shelter. Flight of ideas and rapid speech noted. Pt is oriented to time and place.

## 2017-04-13 NOTE — ED BEHAVIORAL HEALTH ASSESSMENT NOTE - OTHER PAST PSYCHIATRIC HISTORY (INCLUDE DETAILS REGARDING ONSET, COURSE OF ILLNESS, INPATIENT/OUTPATIENT TREATMENT)
Patient has  a psychiatric history significant for Schizoaffective disorder vs Bipolar disorder with psychosis, Alcohol abuse, Marijuana abuse, has a history of prior inpatient psychiatric admissions, was admitted to  on 4/6/2017 for disorganization, psychosis, agitation, was discharged earlier this week with plans to follow up at [facility]. Noncompliant with medications and f/u since discharge from

## 2017-04-14 VITALS
RESPIRATION RATE: 15 BRPM | TEMPERATURE: 98 F | OXYGEN SATURATION: 95 % | DIASTOLIC BLOOD PRESSURE: 85 MMHG | HEART RATE: 96 BPM | SYSTOLIC BLOOD PRESSURE: 119 MMHG

## 2017-04-14 PROCEDURE — 99239 HOSP IP/OBS DSCHRG MGMT >30: CPT

## 2017-04-14 RX ADMIN — Medication 100 MILLIGRAM(S): at 09:17

## 2017-04-14 RX ADMIN — Medication 1 MILLIGRAM(S): at 09:17

## 2017-04-14 RX ADMIN — RISPERIDONE 1 MILLIGRAM(S): 4 TABLET ORAL at 09:17

## 2017-04-19 DIAGNOSIS — F10.10 ALCOHOL ABUSE, UNCOMPLICATED: ICD-10-CM

## 2017-04-19 DIAGNOSIS — Z59.0 HOMELESSNESS: ICD-10-CM

## 2017-04-19 DIAGNOSIS — F12.150 CANNABIS ABUSE WITH PSYCHOTIC DISORDER WITH DELUSIONS: ICD-10-CM

## 2017-04-19 DIAGNOSIS — F25.0 SCHIZOAFFECTIVE DISORDER, BIPOLAR TYPE: ICD-10-CM

## 2017-04-19 DIAGNOSIS — Z91.19 PATIENT'S NONCOMPLIANCE WITH OTHER MEDICAL TREATMENT AND REGIMEN: ICD-10-CM

## 2017-04-19 DIAGNOSIS — G47.00 INSOMNIA, UNSPECIFIED: ICD-10-CM

## 2017-04-19 DIAGNOSIS — F60.9 PERSONALITY DISORDER, UNSPECIFIED: ICD-10-CM

## 2017-04-19 DIAGNOSIS — Z91.14 PATIENT'S OTHER NONCOMPLIANCE WITH MEDICATION REGIMEN: ICD-10-CM

## 2017-04-19 DIAGNOSIS — G40.909 EPILEPSY, UNSPECIFIED, NOT INTRACTABLE, WITHOUT STATUS EPILEPTICUS: ICD-10-CM

## 2017-04-19 SDOH — ECONOMIC STABILITY - HOUSING INSECURITY: HOMELESSNESS: Z59.0

## 2017-05-10 RX ORDER — CARBAMAZEPINE 200 MG
0 TABLET ORAL
Qty: 0 | Refills: 0 | COMMUNITY

## 2018-12-06 NOTE — ED BEHAVIORAL HEALTH ASSESSMENT NOTE - GAF
Telephone Encounter by Esther Torres NCMA at 08/07/17 01:47 PM     Author:  Esther Torres NCMA Service:  (none) Author Type:  Certified Medical Assistant     Filed:  08/07/17 01:47 PM Encounter Date:  8/7/2017 Status:  Signed     :  Esther Torres NCMA (Certified Medical Assistant)       From: Christina Packer  To: Vaishali Lubin DO  Sent: 8/7/2017 12:54 PM CDT  Subject: Imaging Questions    Hi!  I had an ultrasound of my thyroid on Tuesday late afternoon to review   a cyst.  They suggested I would hear your advice by Friday.  No pressure   as I know you are busy; just thought I would follow up.    Thank you.  My cell phone number is [phone].    God [facility],  Haylie Packer       Revision History        Date/Time User Provider Type Action    > 08/07/17 01:47 PM Esther Torres NCMA Certified Medical Assistant Sign    Attribution information within the note text is not available.             25

## 2020-06-03 NOTE — ED ADULT TRIAGE NOTE - WEIGHT IN LBS
male infant    placenta intact  2MLtear and periurethral lac  QBL  pending  
"2020        S: pt feeling mild UCs    O: /67   Temp 98.2  F (36.8  C) (Oral)   Resp 16   Ht 1.727 m (5' 8\")   Wt 100.2 kg (221 lb)   LMP 2019 (Exact Date)   BMI 33.60 kg/m    Gen: NAD, A&O x 3  Abd: gravid, NT  SVE: 2.5/70/-2, amniotomy performed, clear fluid noted    FHT: cat 1 reactive> ~ 1 min after amniotomy, FHT with decel down to 70s, lasting ~5 min with return to baseline now 145 with FECG in place  TOCO: Q4-5 min      A/P: 35yo  @ 37.2 wga here for IOL for gHTN.  AVSS.  - deceleration resolved after resuscitative measures (mainly positional changes).  No cord palpated on exam.  FHT now 145 with FECG, with what looks like artefact but overall moderate variability.  Continue current management        KRYSTINA GALEAS MD    "
Doing well.    vss afeb  Fundus firm and nt  Extremities nl    A: PPD 2      Doing well  P: Discharge home       RTC 6 weeks       Precautions reviewed  
PPD 1/2  vss afeb  FF,NT,Lochia wnl  Imp- PPD1/2  Plan- discharge next am  
164.9

## 2021-12-06 NOTE — ED BEHAVIORAL HEALTH ASSESSMENT NOTE - NS ED BHA MSE PERCEPTIONS COMMAND YN
Called pt to schedule appt with Dr Seay. No answer/left message to call [phone] for scheduling. [facility]  
No